# Patient Record
Sex: FEMALE | Race: WHITE | NOT HISPANIC OR LATINO | Employment: FULL TIME
[De-identification: names, ages, dates, MRNs, and addresses within clinical notes are randomized per-mention and may not be internally consistent; named-entity substitution may affect disease eponyms.]

---

## 2017-07-29 ENCOUNTER — HEALTH MAINTENANCE LETTER (OUTPATIENT)
Age: 47
End: 2017-07-29

## 2022-04-11 ENCOUNTER — OFFICE VISIT (OUTPATIENT)
Dept: CARDIOLOGY | Facility: CLINIC | Age: 52
End: 2022-04-11
Payer: COMMERCIAL

## 2022-04-11 VITALS
WEIGHT: 142.2 LBS | HEIGHT: 68 IN | HEART RATE: 72 BPM | BODY MASS INDEX: 21.55 KG/M2 | SYSTOLIC BLOOD PRESSURE: 120 MMHG | DIASTOLIC BLOOD PRESSURE: 80 MMHG | RESPIRATION RATE: 12 BRPM

## 2022-04-11 DIAGNOSIS — R00.2 PALPITATIONS: Primary | ICD-10-CM

## 2022-04-11 PROCEDURE — 93000 ELECTROCARDIOGRAM COMPLETE: CPT | Performed by: INTERNAL MEDICINE

## 2022-04-11 PROCEDURE — 99204 OFFICE O/P NEW MOD 45 MIN: CPT | Performed by: INTERNAL MEDICINE

## 2022-04-11 RX ORDER — FAMOTIDINE 20 MG
TABLET ORAL DAILY
COMMUNITY

## 2022-04-11 NOTE — PROGRESS NOTES
"  HEART CARE ENCOUNTER CONSULTATON NOTE      [facility]  [phone]      Assessment/Recommendations   Assessment:  1.  Premature atrial contractions: Frequent premature atrial contractions noted on EKG seems asymptomatic.  Likely benign in nature.  We will further evaluate with echocardiogram and holter monitor.     Plan:  1.  Echocardiogram to evaluate for structural heart disease  2.  24-hour Holter monitor  Depending on above results may follow-up as needed       History of Present Illness/Subjective    HPI: Larissa Unger is a 51 year old female with family history of atrial fibrillation who I am seeing today for initial consultation.  She saw her OB/GYN for an annual visit in December and was noted to have an irregular heartbeat.  She does not feel her heart beating irregularly.  Occasionally she notices on her watch that her heart rate will increase significantly to 190s when she is exercising.  She runs 3 days a week for about 3-1/2 miles.  Denies any problems with breathing or chest pain with activity.    Twelve-lead EKG personally reviewed and demonstrates sinus rhythm with frequent PACs       Physical Examination  Review of Systems   Vitals: /80 (BP Location: Right arm, Patient Position: Sitting, Cuff Size: Adult Regular)   Pulse 72   Resp 12   Ht 1.727 m (5' 8\")   Wt 64.5 kg (142 lb 3.2 oz)   BMI 21.62 kg/m    BMI= Body mass index is 21.62 kg/m .  Wt Readings from Last 3 Encounters:   04/11/22 64.5 kg (142 lb 3.2 oz)   02/10/10 64.4 kg (142 lb)   11/10/09 62.1 kg (137 lb)       General Appearance:   no distress, normal body habitus   ENT/Mouth: membranes moist, no oral lesions or bleeding gums.      EYES:  no scleral icterus, normal conjunctivae   Neck: no carotid bruits or thyromegaly   Chest/Lungs:   lungs are clear to auscultation, no rales or wheezing   Cardiovascular:   irregular. Normal first and second heart sounds with no murmurs  no edema bilaterally  "   Abdomen:  no organomegaly, masses, bruits, or tenderness; bowel sounds are present   Extremities: no cyanosis or clubbing   Skin: no xanthelasma, warm.    Neurologic: normal  bilateral, no tremors     Psychiatric: alert and oriented x3, calm        Please refer above for cardiac ROS details.        Medical History  Surgical History Family History Social History   Past Medical History:   Diagnosis Date     NO ACTIVE PROBLEMS      Past Surgical History:   Procedure Laterality Date     HC ENLARGE BREAST WITH IMPLANT      saline     ZZC  DELIVERY ONLY       Family History   Problem Relation Age of Onset     Cerebrovascular Disease Father      Cerebrovascular Disease Maternal Grandmother      Breast Cancer Paternal Grandmother      Alzheimer Disease Paternal Grandmother      Cancer Father         CMML     Gastrointestinal Disease Father         Crohns     Heart Disease Maternal Grandmother      Osteoporosis Mother         Social History     Socioeconomic History     Marital status:      Spouse name: Not on file     Number of children: Not on file     Years of education: Not on file     Highest education level: Not on file   Occupational History     Not on file   Tobacco Use     Smoking status: Never Smoker     Smokeless tobacco: Never Used   Substance and Sexual Activity     Alcohol use: Yes     Comment: 0-14 drinks per week     Drug use: Not on file     Sexual activity: Not on file   Other Topics Concern     Parent/sibling w/ CABG, MI or angioplasty before 65F 55M? Not Asked   Social History Narrative     Not on file     Social Determinants of Health     Financial Resource Strain: Not on file   Food Insecurity: Not on file   Transportation Needs: Not on file   Physical Activity: Not on file   Stress: Not on file   Social Connections: Not on file   Intimate Partner Violence: Not on file   Housing Stability: Not on file           Medications  Allergies   Current Outpatient Medications   Medication  Sig Dispense Refill     Multiple Vitamins-Minerals (MULTIVITAMIN ADULT EXTRA C PO)        NO ACTIVE MEDICATIONS  0 0     Vitamin D, Cholecalciferol, 25 MCG (1000 UT) CAPS Take by mouth daily       AZITHROMYCIN 250 MG OR CAPS 2 tabs PO x 1 then 1 tab PO qday (Patient not taking: Reported on 4/11/2022) 6 Cap 0       Allergies   Allergen Reactions     Codeine      Levaquin      Pcn [Penicillins]           Lab Results    Chemistry/lipid CBC Cardiac Enzymes/BNP/TSH/INR   No results for input(s): CHOL, HDL, LDL, TRIG, CHOLHDLRATIO in the last 23656 hours.  No results for input(s): LDL in the last 38101 hours.  No results for input(s): NA, POTASSIUM, CHLORIDE, CO2, GLC, BUN, CR, GFRESTIMATED, BERTHA in the last 07383 hours.    Invalid input(s): GRFESTBLACK  No results for input(s): CR in the last 74866 hours.  No results for input(s): A1C in the last 75954 hours.       No results for input(s): WBC, HGB, HCT, MCV, PLT in the last 45531 hours.  No results for input(s): HGB in the last 67600 hours. No results for input(s): TROPONINI in the last 21237 hours.  No results for input(s): BNP, NTBNPI, NTBNP in the last 12972 hours.  No results for input(s): TSH in the last 36533 hours.  No results for input(s): INR in the last 53689 hours.     Miladys Falcon MD

## 2022-04-11 NOTE — LETTER
"4/11/2022    Lu Puente MD  [facility] [address] [place] [address]    RE: Larissa Unger       Dear Colleague,     I had the pleasure of seeing Larissa Unger in the [facility].    HEART CARE ENCOUNTER CONSULTATON NOTE      [facility]  [phone]      Assessment/Recommendations   Assessment:  1.  Premature atrial contractions: Frequent premature atrial contractions noted on EKG seems asymptomatic.  Likely benign in nature.  We will further evaluate with echocardiogram and holter monitor.     Plan:  1.  Echocardiogram to evaluate for structural heart disease  2.  24-hour Holter monitor  Depending on above results may follow-up as needed       History of Present Illness/Subjective    HPI: Larissa Unger is a 51 year old female with family history of atrial fibrillation who I am seeing today for initial consultation.  She saw her OB/GYN for an annual visit in December and was noted to have an irregular heartbeat.  She does not feel her heart beating irregularly.  Occasionally she notices on her watch that her heart rate will increase significantly to 190s when she is exercising.  She runs 3 days a week for about 3-1/2 miles.  Denies any problems with breathing or chest pain with activity.    Twelve-lead EKG personally reviewed and demonstrates sinus rhythm with frequent PACs       Physical Examination  Review of Systems   Vitals: /80 (BP Location: Right arm, Patient Position: Sitting, Cuff Size: Adult Regular)   Pulse 72   Resp 12   Ht 1.727 m (5' 8\")   Wt 64.5 kg (142 lb 3.2 oz)   BMI 21.62 kg/m    BMI= Body mass index is 21.62 kg/m .  Wt Readings from Last 3 Encounters:   04/11/22 64.5 kg (142 lb 3.2 oz)   02/10/10 64.4 kg (142 lb)   11/10/09 62.1 kg (137 lb)       General Appearance:   no distress, normal body habitus   ENT/Mouth: membranes moist, no oral lesions or bleeding gums.      EYES:  no scleral icterus, normal conjunctivae   Neck: " no carotid bruits or thyromegaly   Chest/Lungs:   lungs are clear to auscultation, no rales or wheezing   Cardiovascular:   irregular. Normal first and second heart sounds with no murmurs  no edema bilaterally    Abdomen:  no organomegaly, masses, bruits, or tenderness; bowel sounds are present   Extremities: no cyanosis or clubbing   Skin: no xanthelasma, warm.    Neurologic: normal  bilateral, no tremors     Psychiatric: alert and oriented x3, calm        Please refer above for cardiac ROS details.        Medical History  Surgical History Family History Social History   Past Medical History:   Diagnosis Date     NO ACTIVE PROBLEMS      Past Surgical History:   Procedure Laterality Date     HC ENLARGE BREAST WITH IMPLANT      saline     ZZC  DELIVERY ONLY       Family History   Problem Relation Age of Onset     Cerebrovascular Disease Father      Cerebrovascular Disease Maternal Grandmother      Breast Cancer Paternal Grandmother      Alzheimer Disease Paternal Grandmother      Cancer Father         CMML     Gastrointestinal Disease Father         Crohns     Heart Disease Maternal Grandmother      Osteoporosis Mother         Social History     Socioeconomic History     Marital status:      Spouse name: Not on file     Number of children: Not on file     Years of education: Not on file     Highest education level: Not on file   Occupational History     Not on file   Tobacco Use     Smoking status: Never Smoker     Smokeless tobacco: Never Used   Substance and Sexual Activity     Alcohol use: Yes     Comment: 0-14 drinks per week     Drug use: Not on file     Sexual activity: Not on file   Other Topics Concern     Parent/sibling w/ CABG, MI or angioplasty before 65F 55M? Not Asked   Social History Narrative     Not on file     Social Determinants of Health     Financial Resource Strain: Not on file   Food Insecurity: Not on file   Transportation Needs: Not on file   Physical Activity: Not on  file   Stress: Not on file   Social Connections: Not on file   Intimate Partner Violence: Not on file   Housing Stability: Not on file           Medications  Allergies   Current Outpatient Medications   Medication Sig Dispense Refill     Multiple Vitamins-Minerals (MULTIVITAMIN ADULT EXTRA C PO)        NO ACTIVE MEDICATIONS  0 0     Vitamin D, Cholecalciferol, 25 MCG (1000 UT) CAPS Take by mouth daily       AZITHROMYCIN 250 MG OR CAPS 2 tabs PO x 1 then 1 tab PO qday (Patient not taking: Reported on 4/11/2022) 6 Cap 0       Allergies   Allergen Reactions     Codeine      Levaquin      Pcn [Penicillins]           Lab Results    Chemistry/lipid CBC Cardiac Enzymes/BNP/TSH/INR   No results for input(s): CHOL, HDL, LDL, TRIG, CHOLHDLRATIO in the last 40920 hours.  No results for input(s): LDL in the last 55831 hours.  No results for input(s): NA, POTASSIUM, CHLORIDE, CO2, GLC, BUN, CR, GFRESTIMATED, BERTHA in the last 07600 hours.    Invalid input(s): GRFESTBLACK  No results for input(s): CR in the last 03060 hours.  No results for input(s): A1C in the last 91204 hours.       No results for input(s): WBC, HGB, HCT, MCV, PLT in the last 22243 hours.  No results for input(s): HGB in the last 19789 hours. No results for input(s): TROPONINI in the last 57650 hours.  No results for input(s): BNP, NTBNPI, NTBNP in the last 36422 hours.  No results for input(s): TSH in the last 98597 hours.  No results for input(s): INR in the last 05159 hours.     Miladys Falcon MD    Thank you for allowing me to participate in the care of your patient.      Sincerely,     Miladys Falcno MD     [facility]  cc:   No referring provider defined for this encounter.

## 2022-04-12 LAB
ATRIAL RATE - MUSE: 68 BPM
DIASTOLIC BLOOD PRESSURE - MUSE: NORMAL MMHG
INTERPRETATION ECG - MUSE: NORMAL
P AXIS - MUSE: 66 DEGREES
PR INTERVAL - MUSE: 158 MS
QRS DURATION - MUSE: 82 MS
QT - MUSE: 430 MS
QTC - MUSE: 457 MS
R AXIS - MUSE: 54 DEGREES
SYSTOLIC BLOOD PRESSURE - MUSE: NORMAL MMHG
T AXIS - MUSE: 31 DEGREES
VENTRICULAR RATE- MUSE: 68 BPM

## 2022-04-21 ENCOUNTER — HOSPITAL ENCOUNTER (OUTPATIENT)
Dept: CARDIOLOGY | Facility: CLINIC | Age: 52
Discharge: HOME | End: 2022-04-21
Attending: INTERNAL MEDICINE
Payer: COMMERCIAL

## 2022-04-21 DIAGNOSIS — R00.2 PALPITATIONS: ICD-10-CM

## 2022-04-21 LAB — LVEF ECHO: NORMAL

## 2022-04-21 PROCEDURE — 93306 TTE W/DOPPLER COMPLETE: CPT

## 2022-04-21 PROCEDURE — 93306 TTE W/DOPPLER COMPLETE: CPT | Mod: 26 | Performed by: INTERNAL MEDICINE

## 2022-04-21 PROCEDURE — 93226 XTRNL ECG REC<48 HR SCAN A/R: CPT

## 2022-04-25 PROCEDURE — 93227 XTRNL ECG REC<48 HR R&I: CPT | Performed by: INTERNAL MEDICINE

## 2022-04-30 ENCOUNTER — HEALTH MAINTENANCE LETTER (OUTPATIENT)
Age: 52
End: 2022-04-30

## 2022-05-05 DIAGNOSIS — I49.1 PAC (PREMATURE ATRIAL CONTRACTION): ICD-10-CM

## 2022-05-05 DIAGNOSIS — R00.2 PALPITATIONS: Primary | ICD-10-CM

## 2022-05-05 DIAGNOSIS — I47.19 ATRIAL TACHYCARDIA (H): ICD-10-CM

## 2022-11-20 ENCOUNTER — HEALTH MAINTENANCE LETTER (OUTPATIENT)
Age: 52
End: 2022-11-20

## 2023-04-15 ENCOUNTER — HEALTH MAINTENANCE LETTER (OUTPATIENT)
Age: 53
End: 2023-04-15

## 2023-06-02 ENCOUNTER — HEALTH MAINTENANCE LETTER (OUTPATIENT)
Age: 53
End: 2023-06-02

## 2024-04-13 ENCOUNTER — HEALTH MAINTENANCE LETTER (OUTPATIENT)
Age: 54
End: 2024-04-13

## 2024-06-22 ENCOUNTER — HEALTH MAINTENANCE LETTER (OUTPATIENT)
Age: 54
End: 2024-06-22

## 2025-07-12 ENCOUNTER — HEALTH MAINTENANCE LETTER (OUTPATIENT)
Age: 55
End: 2025-07-12